# Patient Record
Sex: MALE | URBAN - METROPOLITAN AREA
[De-identification: names, ages, dates, MRNs, and addresses within clinical notes are randomized per-mention and may not be internally consistent; named-entity substitution may affect disease eponyms.]

---

## 2023-04-19 ENCOUNTER — TELEPHONE ENCOUNTER (OUTPATIENT)
Dept: URBAN - METROPOLITAN AREA CLINIC 113 | Facility: CLINIC | Age: 48
End: 2023-04-19

## 2023-05-02 ENCOUNTER — OFFICE VISIT (OUTPATIENT)
Dept: URBAN - METROPOLITAN AREA CLINIC 72 | Facility: CLINIC | Age: 48
End: 2023-05-02
Payer: MEDICARE

## 2023-05-02 ENCOUNTER — LAB OUTSIDE AN ENCOUNTER (OUTPATIENT)
Dept: URBAN - METROPOLITAN AREA CLINIC 72 | Facility: CLINIC | Age: 48
End: 2023-05-02

## 2023-05-02 ENCOUNTER — WEB ENCOUNTER (OUTPATIENT)
Dept: URBAN - METROPOLITAN AREA CLINIC 72 | Facility: CLINIC | Age: 48
End: 2023-05-02

## 2023-05-02 ENCOUNTER — DASHBOARD ENCOUNTERS (OUTPATIENT)
Age: 48
End: 2023-05-02

## 2023-05-02 VITALS
WEIGHT: 150 LBS | HEART RATE: 119 BPM | TEMPERATURE: 97 F | SYSTOLIC BLOOD PRESSURE: 101 MMHG | DIASTOLIC BLOOD PRESSURE: 77 MMHG

## 2023-05-02 DIAGNOSIS — C80.1 ADENOCARCINOMA: ICD-10-CM

## 2023-05-02 DIAGNOSIS — Z93.2 ILEOSTOMY IN PLACE: ICD-10-CM

## 2023-05-02 PROBLEM — 302111002: Status: ACTIVE | Noted: 2023-05-02

## 2023-05-02 PROBLEM — 443961001: Status: ACTIVE | Noted: 2023-05-02

## 2023-05-02 PROBLEM — 302112009: Status: ACTIVE | Noted: 2023-05-02

## 2023-05-02 PROCEDURE — 99204 OFFICE O/P NEW MOD 45 MIN: CPT | Performed by: NURSE PRACTITIONER

## 2023-05-02 RX ORDER — SITAGLIPTIN AND METFORMIN HYDROCHLORIDE 50; 500 MG/1; MG/1
TABLET, FILM COATED ORAL
Qty: 180 TABLET | Status: ACTIVE | COMMUNITY

## 2023-05-02 RX ORDER — NYSTATIN 100000 [USP'U]/G
APPLY TO AFFECTED AREA TWICE A DAY OINTMENT TOPICAL
Qty: 60 GRAM | Refills: 1 | Status: ACTIVE | COMMUNITY

## 2023-05-02 RX ORDER — MUPIROCIN 20 MG/G
OINTMENT TOPICAL
Qty: 22 GRAM | Status: ON HOLD | COMMUNITY

## 2023-05-02 RX ORDER — OLANZAPINE 5 MG/1
TABLET ORAL
Qty: 450 TABLET | Status: ACTIVE | COMMUNITY

## 2023-05-02 RX ORDER — DIVALPROEX SODIUM 125 MG/1
TABLET, DELAYED RELEASE ORAL
Qty: 900 TABLET | Status: ACTIVE | COMMUNITY

## 2023-05-02 RX ORDER — PEN NEEDLE, DIABETIC 32 GX 1/4"
NEEDLE, DISPOSABLE MISCELLANEOUS
Qty: 100 UNSPECIFIED | Status: ACTIVE | COMMUNITY

## 2023-05-02 RX ORDER — ICOSAPENT ETHYL 1000 MG/1
CAPSULE ORAL
Qty: 180 CAPSULE | Status: ACTIVE | COMMUNITY

## 2023-05-02 RX ORDER — SODIUM FLUORIDE 0.9 MG/ML
PLEASE SEE ATTACHED FOR DETAILED DIRECTIONS MOUTHWASH DENTAL
Qty: 473 MILLILITER | Refills: 1 | Status: ACTIVE | COMMUNITY

## 2023-05-02 RX ORDER — LANCETS 28 GAUGE
USE TO TEST 3 TIMES A DAY EACH MISCELLANEOUS
Qty: 300 EACH | Refills: 0 | Status: ACTIVE | COMMUNITY

## 2023-05-02 RX ORDER — CEFDINIR 250 MG/5ML
POWDER, FOR SUSPENSION ORAL
Qty: 100 UNSPECIFIED | Status: ACTIVE | COMMUNITY

## 2023-05-02 RX ORDER — CHLORHEXIDINE GLUCONATE 1.2 MG/ML
RINSE ORAL
Qty: 473 UNSPECIFIED | Status: ACTIVE | COMMUNITY

## 2023-05-02 RX ORDER — INSULIN GLARGINE 100 [IU]/ML
INJECTION, SOLUTION SUBCUTANEOUS
Qty: 15 UNSPECIFIED | Status: ACTIVE | COMMUNITY

## 2023-05-02 RX ORDER — LIRAGLUTIDE 6 MG/ML
INJECTION SUBCUTANEOUS
Qty: 6 UNSPECIFIED | Status: ACTIVE | COMMUNITY

## 2023-05-02 RX ORDER — TRAVOPROST 0.04 MG/ML
SOLUTION OPHTHALMIC
Qty: 2 UNSPECIFIED | Status: ACTIVE | COMMUNITY

## 2023-05-02 NOTE — HPI-TODAY'S VISIT:
48-year-old male new to the clinic referred by Dr. Polo/Nancy  for intestinal adenocarcinoma.  Request urgent colonoscopy and biopsy of stoma.   A copy of this note will be sent to the referring provider.  Past medical history of diabetes mellitus type 2, he has some developmental delay.  Also has a prior history of psoriasis.   Colon was twisted in 2006 and most of it was removed.    Had mid abdomen biopsy performed 4/6/23 revealed adenocarcinoma involving dermis with morphology and immunophenotype favor metastasis from GI tract.  Was seen by Dr. Polo 4/6/23 for skin mass arising near his umbilicus.  Ported it developed over the past 3 months.  Reported clear drainage.  No weight change.  Has stoma in left lower quadrant functioning well.  Imaging studies at [facility].  CT abdomen and pelvis without contrast 3/30/2023 at [facility].  Liver and lung bases unremarkable.  Left kidney removed with surgical clips noted retained right kidney unremarkable.  Left lower quadrant ileostomy noted.  Majority of colon appears to been resected.  Rectal pouch is still in place which appears to connect up to the umbilicus with the rectum distended with dense stool like material suggesting large fecalith measuring over 8 cm in diameter.  CT of chest with IV contrast 4/19/2023.  0.3 x 0.3 cm inferior aspect of left upper lobe pulmonary nodule and 0.3 x 0.3 cm calcified left lower lobe pulmonary nodule.  Mild splenomegaly 15.5 cm in craniocaudal dimension.    On interview to his mother and grandmother.  He is developmentally delayed but answers questions appropriately and has a pleasant mood and affect.  Symptoms started with some inflammation to his umbilical area in February, was given antibiotics and was eventually seen by dermatology who was concerned about possible cancer and referred to Dr. Polo. Reports stools are soft in the bag no melena, hematochezia.  He denies abdominal pain.  No nausea or vomiting. His family is concerned about dehydration with any  bowel prep.   He is able to use pedialyte.  He can't have any dyes (green, yellow)  Can't drink gatorade.  Vomited frost even.  He has a port that was recently placed (Friday) and are hoping this can be used for his procedure.   No CP, SOB, palpitations, syncope, near-syncope or problems with anesthesia previously.  Nurse Navigator-Aleena is coordinatiing everything at [facility] [place].  Chemo wont be started until he is geting the procedure

## 2023-05-03 ENCOUNTER — TELEPHONE ENCOUNTER (OUTPATIENT)
Dept: URBAN - METROPOLITAN AREA CLINIC 72 | Facility: CLINIC | Age: 48
End: 2023-05-03

## 2023-05-04 ENCOUNTER — OFFICE VISIT (OUTPATIENT)
Dept: URBAN - METROPOLITAN AREA MEDICAL CENTER 40 | Facility: MEDICAL CENTER | Age: 48
End: 2023-05-04
Payer: MEDICARE

## 2023-05-04 DIAGNOSIS — D12.8 ADENOMATOUS POLYP OF RECTUM: ICD-10-CM

## 2023-05-04 DIAGNOSIS — R93.3 ABN FINDINGS-GI TRACT: ICD-10-CM

## 2023-05-04 PROCEDURE — 44380 SMALL BOWEL ENDOSCOPY BR/WA: CPT | Performed by: INTERNAL MEDICINE

## 2023-05-04 PROCEDURE — 45305 PROCTOSIGMOIDOSCOPY W/BX: CPT | Performed by: INTERNAL MEDICINE
